# Patient Record
Sex: FEMALE | Race: WHITE | Employment: OTHER | URBAN - METROPOLITAN AREA
[De-identification: names, ages, dates, MRNs, and addresses within clinical notes are randomized per-mention and may not be internally consistent; named-entity substitution may affect disease eponyms.]

---

## 2019-12-19 ENCOUNTER — HOSPITAL ENCOUNTER (INPATIENT)
Facility: CLINIC | Age: 49
LOS: 1 days | Discharge: HOME | DRG: 392 | End: 2019-12-21
Attending: EMERGENCY MEDICINE | Admitting: INTERNAL MEDICINE
Payer: COMMERCIAL

## 2019-12-19 ENCOUNTER — APPOINTMENT (OUTPATIENT)
Dept: CT IMAGING | Facility: CLINIC | Age: 49
DRG: 392 | End: 2019-12-19
Attending: EMERGENCY MEDICINE
Payer: COMMERCIAL

## 2019-12-19 DIAGNOSIS — R91.1 RIGHT MIDDLE LOBE PULMONARY NODULE: ICD-10-CM

## 2019-12-19 DIAGNOSIS — K56.609 SMALL BOWEL OBSTRUCTION (H): ICD-10-CM

## 2019-12-19 DIAGNOSIS — R10.11 RUQ ABDOMINAL PAIN: ICD-10-CM

## 2019-12-19 LAB
ALBUMIN SERPL-MCNC: 4.5 G/DL (ref 3.4–5)
ALP SERPL-CCNC: 137 U/L (ref 40–150)
ALT SERPL W P-5'-P-CCNC: 32 U/L (ref 0–50)
ANION GAP SERPL CALCULATED.3IONS-SCNC: 10 MMOL/L (ref 3–14)
AST SERPL W P-5'-P-CCNC: 20 U/L (ref 0–45)
BASOPHILS # BLD AUTO: 0.1 10E9/L (ref 0–0.2)
BASOPHILS NFR BLD AUTO: 0.5 %
BILIRUB SERPL-MCNC: 0.4 MG/DL (ref 0.2–1.3)
BUN SERPL-MCNC: 13 MG/DL (ref 7–30)
CALCIUM SERPL-MCNC: 9.6 MG/DL (ref 8.5–10.1)
CHLORIDE SERPL-SCNC: 105 MMOL/L (ref 94–109)
CO2 BLDCOV-SCNC: 26 MMOL/L (ref 21–28)
CO2 SERPL-SCNC: 25 MMOL/L (ref 20–32)
CREAT SERPL-MCNC: 0.56 MG/DL (ref 0.52–1.04)
DIFFERENTIAL METHOD BLD: ABNORMAL
EOSINOPHIL # BLD AUTO: 0 10E9/L (ref 0–0.7)
EOSINOPHIL NFR BLD AUTO: 0.1 %
ERYTHROCYTE [DISTWIDTH] IN BLOOD BY AUTOMATED COUNT: 13 % (ref 10–15)
GFR SERPL CREATININE-BSD FRML MDRD: >90 ML/MIN/{1.73_M2}
GLUCOSE SERPL-MCNC: 132 MG/DL (ref 70–99)
HCT VFR BLD AUTO: 45.5 % (ref 35–47)
HGB BLD-MCNC: 15.1 G/DL (ref 11.7–15.7)
IMM GRANULOCYTES # BLD: 0.1 10E9/L (ref 0–0.4)
IMM GRANULOCYTES NFR BLD: 0.5 %
INTERPRETATION ECG - MUSE: NORMAL
LACTATE BLD-SCNC: 2 MMOL/L (ref 0.7–2.1)
LIPASE SERPL-CCNC: 79 U/L (ref 73–393)
LYMPHOCYTES # BLD AUTO: 1.3 10E9/L (ref 0.8–5.3)
LYMPHOCYTES NFR BLD AUTO: 9.5 %
MCH RBC QN AUTO: 30.6 PG (ref 26.5–33)
MCHC RBC AUTO-ENTMCNC: 33.2 G/DL (ref 31.5–36.5)
MCV RBC AUTO: 92 FL (ref 78–100)
MONOCYTES # BLD AUTO: 0.4 10E9/L (ref 0–1.3)
MONOCYTES NFR BLD AUTO: 2.8 %
NEUTROPHILS # BLD AUTO: 11.5 10E9/L (ref 1.6–8.3)
NEUTROPHILS NFR BLD AUTO: 86.6 %
NRBC # BLD AUTO: 0 10*3/UL
NRBC BLD AUTO-RTO: 0 /100
PCO2 BLDV: 44 MM HG (ref 40–50)
PH BLDV: 7.38 PH (ref 7.32–7.43)
PLATELET # BLD AUTO: 298 10E9/L (ref 150–450)
PO2 BLDV: 33 MM HG (ref 25–47)
POTASSIUM SERPL-SCNC: 3.7 MMOL/L (ref 3.4–5.3)
PROT SERPL-MCNC: 8.3 G/DL (ref 6.8–8.8)
RBC # BLD AUTO: 4.93 10E12/L (ref 3.8–5.2)
SAO2 % BLDV FROM PO2: 61 %
SODIUM SERPL-SCNC: 140 MMOL/L (ref 133–144)
WBC # BLD AUTO: 13.3 10E9/L (ref 4–11)

## 2019-12-19 PROCEDURE — 82803 BLOOD GASES ANY COMBINATION: CPT

## 2019-12-19 PROCEDURE — 74177 CT ABD & PELVIS W/CONTRAST: CPT

## 2019-12-19 PROCEDURE — 93005 ELECTROCARDIOGRAM TRACING: CPT

## 2019-12-19 PROCEDURE — 25000128 H RX IP 250 OP 636: Performed by: EMERGENCY MEDICINE

## 2019-12-19 PROCEDURE — 96365 THER/PROPH/DIAG IV INF INIT: CPT | Mod: 59

## 2019-12-19 PROCEDURE — 96375 TX/PRO/DX INJ NEW DRUG ADDON: CPT

## 2019-12-19 PROCEDURE — 25000125 ZZHC RX 250: Performed by: EMERGENCY MEDICINE

## 2019-12-19 PROCEDURE — 83605 ASSAY OF LACTIC ACID: CPT

## 2019-12-19 PROCEDURE — 99285 EMERGENCY DEPT VISIT HI MDM: CPT | Mod: 25

## 2019-12-19 PROCEDURE — 80053 COMPREHEN METABOLIC PANEL: CPT | Performed by: EMERGENCY MEDICINE

## 2019-12-19 PROCEDURE — 85025 COMPLETE CBC W/AUTO DIFF WBC: CPT | Performed by: EMERGENCY MEDICINE

## 2019-12-19 PROCEDURE — 96361 HYDRATE IV INFUSION ADD-ON: CPT

## 2019-12-19 PROCEDURE — 25800030 ZZH RX IP 258 OP 636: Performed by: EMERGENCY MEDICINE

## 2019-12-19 PROCEDURE — 83690 ASSAY OF LIPASE: CPT | Performed by: EMERGENCY MEDICINE

## 2019-12-19 RX ORDER — CETIRIZINE HYDROCHLORIDE 10 MG/1
10 TABLET ORAL DAILY PRN
COMMUNITY

## 2019-12-19 RX ORDER — MOMETASONE FUROATE MONOHYDRATE 50 UG/1
1 SPRAY, METERED NASAL DAILY
COMMUNITY

## 2019-12-19 RX ORDER — MORPHINE SULFATE 4 MG/ML
4 INJECTION, SOLUTION INTRAMUSCULAR; INTRAVENOUS
Status: DISCONTINUED | OUTPATIENT
Start: 2019-12-19 | End: 2019-12-20

## 2019-12-19 RX ORDER — LOPERAMIDE HCL 2 MG
2 CAPSULE ORAL 4 TIMES DAILY PRN
COMMUNITY

## 2019-12-19 RX ORDER — IOPAMIDOL 755 MG/ML
500 INJECTION, SOLUTION INTRAVASCULAR ONCE
Status: COMPLETED | OUTPATIENT
Start: 2019-12-19 | End: 2019-12-19

## 2019-12-19 RX ORDER — ONDANSETRON 2 MG/ML
4 INJECTION INTRAMUSCULAR; INTRAVENOUS EVERY 30 MIN PRN
Status: DISCONTINUED | OUTPATIENT
Start: 2019-12-19 | End: 2019-12-20

## 2019-12-19 RX ORDER — SODIUM CHLORIDE, SODIUM LACTATE, POTASSIUM CHLORIDE, CALCIUM CHLORIDE 600; 310; 30; 20 MG/100ML; MG/100ML; MG/100ML; MG/100ML
1000 INJECTION, SOLUTION INTRAVENOUS CONTINUOUS
Status: DISCONTINUED | OUTPATIENT
Start: 2019-12-19 | End: 2019-12-20

## 2019-12-19 RX ADMIN — FAMOTIDINE 20 MG: 10 INJECTION, SOLUTION INTRAVENOUS at 20:32

## 2019-12-19 RX ADMIN — TAZOBACTAM SODIUM AND PIPERACILLIN SODIUM 3.38 G: 375; 3 INJECTION, SOLUTION INTRAVENOUS at 23:00

## 2019-12-19 RX ADMIN — SODIUM CHLORIDE 59 ML: 9 INJECTION, SOLUTION INTRAVENOUS at 21:48

## 2019-12-19 RX ADMIN — SODIUM CHLORIDE, POTASSIUM CHLORIDE, SODIUM LACTATE AND CALCIUM CHLORIDE 1000 ML: 600; 310; 30; 20 INJECTION, SOLUTION INTRAVENOUS at 20:40

## 2019-12-19 RX ADMIN — IOPAMIDOL 74 ML: 755 INJECTION, SOLUTION INTRAVENOUS at 21:47

## 2019-12-19 RX ADMIN — MORPHINE SULFATE 4 MG: 4 INJECTION INTRAVENOUS at 20:32

## 2019-12-19 RX ADMIN — ONDANSETRON HYDROCHLORIDE 4 MG: 2 INJECTION, SOLUTION INTRAMUSCULAR; INTRAVENOUS at 20:32

## 2019-12-19 ASSESSMENT — ENCOUNTER SYMPTOMS
DIARRHEA: 1
VOMITING: 1
NAUSEA: 1
ABDOMINAL PAIN: 1

## 2019-12-20 PROBLEM — K56.609 SBO (SMALL BOWEL OBSTRUCTION) (H): Status: ACTIVE | Noted: 2019-12-20

## 2019-12-20 LAB
ALBUMIN UR-MCNC: 30 MG/DL
APPEARANCE UR: ABNORMAL
BILIRUB UR QL STRIP: NEGATIVE
COLOR UR AUTO: YELLOW
GLUCOSE UR STRIP-MCNC: NEGATIVE MG/DL
HGB UR QL STRIP: NEGATIVE
KETONES UR STRIP-MCNC: ABNORMAL MG/DL
LEUKOCYTE ESTERASE UR QL STRIP: ABNORMAL
MUCOUS THREADS #/AREA URNS LPF: PRESENT /LPF
NITRATE UR QL: NEGATIVE
PH UR STRIP: 5.5 PH (ref 5–7)
RBC #/AREA URNS AUTO: 3 /HPF (ref 0–2)
SOURCE: ABNORMAL
SP GR UR STRIP: 1.02 (ref 1–1.03)
SQUAMOUS #/AREA URNS AUTO: 25 /HPF (ref 0–1)
UROBILINOGEN UR STRIP-MCNC: NORMAL MG/DL (ref 0–2)
WBC #/AREA URNS AUTO: 18 /HPF (ref 0–5)

## 2019-12-20 PROCEDURE — 25800030 ZZH RX IP 258 OP 636: Performed by: INTERNAL MEDICINE

## 2019-12-20 PROCEDURE — 99222 1ST HOSP IP/OBS MODERATE 55: CPT | Mod: AI | Performed by: INTERNAL MEDICINE

## 2019-12-20 PROCEDURE — 81001 URINALYSIS AUTO W/SCOPE: CPT | Performed by: INTERNAL MEDICINE

## 2019-12-20 PROCEDURE — 25000128 H RX IP 250 OP 636: Performed by: EMERGENCY MEDICINE

## 2019-12-20 PROCEDURE — 96376 TX/PRO/DX INJ SAME DRUG ADON: CPT

## 2019-12-20 PROCEDURE — 87086 URINE CULTURE/COLONY COUNT: CPT | Performed by: INTERNAL MEDICINE

## 2019-12-20 PROCEDURE — 12000000 ZZH R&B MED SURG/OB

## 2019-12-20 PROCEDURE — 25000128 H RX IP 250 OP 636: Performed by: INTERNAL MEDICINE

## 2019-12-20 RX ORDER — LIDOCAINE 40 MG/G
CREAM TOPICAL
Status: DISCONTINUED | OUTPATIENT
Start: 2019-12-20 | End: 2019-12-21 | Stop reason: HOSPADM

## 2019-12-20 RX ORDER — SODIUM CHLORIDE 9 MG/ML
INJECTION, SOLUTION INTRAVENOUS CONTINUOUS
Status: DISCONTINUED | OUTPATIENT
Start: 2019-12-20 | End: 2019-12-21

## 2019-12-20 RX ORDER — ONDANSETRON 4 MG/1
4 TABLET, ORALLY DISINTEGRATING ORAL EVERY 6 HOURS PRN
Status: DISCONTINUED | OUTPATIENT
Start: 2019-12-20 | End: 2019-12-21 | Stop reason: HOSPADM

## 2019-12-20 RX ORDER — FLUTICASONE PROPIONATE 50 MCG
1 SPRAY, SUSPENSION (ML) NASAL DAILY
Status: DISCONTINUED | OUTPATIENT
Start: 2019-12-20 | End: 2019-12-21 | Stop reason: HOSPADM

## 2019-12-20 RX ORDER — HYDROMORPHONE HYDROCHLORIDE 1 MG/ML
0.2 INJECTION, SOLUTION INTRAMUSCULAR; INTRAVENOUS; SUBCUTANEOUS
Status: DISCONTINUED | OUTPATIENT
Start: 2019-12-20 | End: 2019-12-21 | Stop reason: HOSPADM

## 2019-12-20 RX ORDER — ONDANSETRON 2 MG/ML
4 INJECTION INTRAMUSCULAR; INTRAVENOUS EVERY 6 HOURS PRN
Status: DISCONTINUED | OUTPATIENT
Start: 2019-12-20 | End: 2019-12-21 | Stop reason: HOSPADM

## 2019-12-20 RX ORDER — ONDANSETRON 4 MG/1
4 TABLET, ORALLY DISINTEGRATING ORAL EVERY 6 HOURS PRN
Status: DISCONTINUED | OUTPATIENT
Start: 2019-12-20 | End: 2019-12-20

## 2019-12-20 RX ORDER — NALOXONE HYDROCHLORIDE 0.4 MG/ML
.1-.4 INJECTION, SOLUTION INTRAMUSCULAR; INTRAVENOUS; SUBCUTANEOUS
Status: DISCONTINUED | OUTPATIENT
Start: 2019-12-20 | End: 2019-12-21 | Stop reason: HOSPADM

## 2019-12-20 RX ORDER — ONDANSETRON 2 MG/ML
4 INJECTION INTRAMUSCULAR; INTRAVENOUS EVERY 6 HOURS PRN
Status: DISCONTINUED | OUTPATIENT
Start: 2019-12-20 | End: 2019-12-20

## 2019-12-20 RX ADMIN — TAZOBACTAM SODIUM AND PIPERACILLIN SODIUM 3.38 G: 375; 3 INJECTION, SOLUTION INTRAVENOUS at 15:51

## 2019-12-20 RX ADMIN — HYDROMORPHONE HYDROCHLORIDE 0.2 MG: 1 INJECTION, SOLUTION INTRAMUSCULAR; INTRAVENOUS; SUBCUTANEOUS at 12:49

## 2019-12-20 RX ADMIN — SODIUM CHLORIDE: 9 INJECTION, SOLUTION INTRAVENOUS at 22:42

## 2019-12-20 RX ADMIN — TAZOBACTAM SODIUM AND PIPERACILLIN SODIUM 3.38 G: 375; 3 INJECTION, SOLUTION INTRAVENOUS at 10:28

## 2019-12-20 RX ADMIN — SODIUM CHLORIDE: 9 INJECTION, SOLUTION INTRAVENOUS at 01:46

## 2019-12-20 RX ADMIN — ONDANSETRON HYDROCHLORIDE 4 MG: 2 INJECTION, SOLUTION INTRAMUSCULAR; INTRAVENOUS at 01:43

## 2019-12-20 RX ADMIN — TAZOBACTAM SODIUM AND PIPERACILLIN SODIUM 3.38 G: 375; 3 INJECTION, SOLUTION INTRAVENOUS at 03:59

## 2019-12-20 RX ADMIN — HYDROMORPHONE HYDROCHLORIDE 0.2 MG: 1 INJECTION, SOLUTION INTRAMUSCULAR; INTRAVENOUS; SUBCUTANEOUS at 09:43

## 2019-12-20 RX ADMIN — SODIUM CHLORIDE: 9 INJECTION, SOLUTION INTRAVENOUS at 12:39

## 2019-12-20 RX ADMIN — MORPHINE SULFATE 4 MG: 4 INJECTION INTRAVENOUS at 00:13

## 2019-12-20 RX ADMIN — HYDROMORPHONE HYDROCHLORIDE 0.2 MG: 1 INJECTION, SOLUTION INTRAMUSCULAR; INTRAVENOUS; SUBCUTANEOUS at 04:15

## 2019-12-20 RX ADMIN — TAZOBACTAM SODIUM AND PIPERACILLIN SODIUM 3.38 G: 375; 3 INJECTION, SOLUTION INTRAVENOUS at 22:41

## 2019-12-20 RX ADMIN — ONDANSETRON HYDROCHLORIDE 4 MG: 2 INJECTION, SOLUTION INTRAMUSCULAR; INTRAVENOUS at 00:09

## 2019-12-20 ASSESSMENT — ACTIVITIES OF DAILY LIVING (ADL)
DRESS: 0-->INDEPENDENT
TRANSFERRING: 0-->INDEPENDENT
AMBULATION: 0-->INDEPENDENT
TOILETING: 0-->INDEPENDENT
ADLS_ACUITY_SCORE: 14
BATHING: 0-->INDEPENDENT
SWALLOWING: 0-->SWALLOWS FOODS/LIQUIDS WITHOUT DIFFICULTY
FALL_HISTORY_WITHIN_LAST_SIX_MONTHS: NO
ADLS_ACUITY_SCORE: 14
ADLS_ACUITY_SCORE: 12
COGNITION: 0 - NO COGNITION ISSUES REPORTED
RETIRED_EATING: 0-->INDEPENDENT
ADLS_ACUITY_SCORE: 12
ADLS_ACUITY_SCORE: 14
RETIRED_COMMUNICATION: 0-->UNDERSTANDS/COMMUNICATES WITHOUT DIFFICULTY

## 2019-12-20 ASSESSMENT — MIFFLIN-ST. JEOR: SCORE: 1337.57

## 2019-12-20 NOTE — H&P
[facility] [place]    History and Physical - Hospitalist Service       Date of Admission:  12/19/2019    Assessment & Plan   Clive Mcnair is a 49 year old female admitted on 12/19/2019. She has a past medical history significant for IBS and previous rectal cancer.  She presented to emergency room for abdominal pain.  Found to have small bowel obstruction and possible terminal ileitis.    1.  Small bowel obstruction.  N.p.o.  Continuous IV fluids.  Pain medications as needed.  Antiemetics as needed.  General surgery consult.      2.  Possible terminal ileitis.  Continue to cover possible infection with IV Zosyn.  General surgery and gastroenterology consults.  Continuous IV fluids.  N.p.o.  Pain medications as needed.     Diet: NPO for Medical/Clinical Reasons Except for: No Exceptions    DVT Prophylaxis: Pneumatic Compression Devices  [place] Catheter: not present  Code Status: Full Code      Disposition Plan   Expected discharge: 2 to 3 days.  Recommended to prior living arrangement     Reginaldo Copeland, DO  [facility] [place]    ______________________________________________________________________    Chief Complaint   Abdominal pain.    History is obtained from the patient    History of Present Illness   Clive Mcnair is a 49 year old female who has a past medical history significant for IBS and rectal cancer.  She developed fairly acute onset of abdominal pain early in the day on 12/19.  She does have fairly frequent episodes of abdominal pain due to her IBS.  Abdominal pain that started on 12/19 felt significantly different than usual abdominal pain from her IBS.  Pain is located in the upper quadrants of her abdomen bilaterally.  Usually she has pain in her lower abdomen.  She did try taking some ibuprofen for pain at home.  Ibuprofen did not help any significant extent with pain.  She also developed nausea and has vomited 5-6 times since symptoms began.  She is also felt like she has  had intermittent fevers and chills.Symptoms are somewhat similar to symptoms that she had when she required removal of her gallbladder a year and a half ago.  Symptoms are better at this time after pain and nausea medications given since arrival at hospital.  No other acute complaints.    Review of Systems    The 10 point Review of Systems is negative other than noted in the HPI     Past Medical History    IBS.  Rectal cancer.    Past Surgical History   I have reviewed this patient's surgical history and updated it with pertinent information if needed.  Cholecystectomy approximately 18 months ago.    Social History   I have reviewed this patient's social history and updated it with pertinent information if needed.  Social History     Tobacco Use     Smoking status: Not on file   Substance Use Topics     Alcohol use: Not on file     Drug use: Not on file       Family History   I have reviewed this patient's family history and updated it with pertinent information if needed.   No known CAD or cancer in immediate family.    Prior to Admission Medications   Prior to Admission Medications   Prescriptions Last Dose Informant Patient Reported? Taking?   DM-APAP-CPM (VICKS NYQUIL COLD & FLU NIGHT) -4 MG/30ML LIQD 12/18/2019 at Unknown time  Yes Yes   Sig: Take 15 mLs by mouth At Bedtime   cetirizine (ZYRTEC) 10 MG tablet Past Week at Unknown time  Yes Yes   Sig: Take 10 mg by mouth daily as needed for allergies   loperamide (IMODIUM) 2 MG capsule 12/19/2019 at Unknown time  Yes Yes   Sig: Take 2 mg by mouth 4 times daily as needed for diarrhea   mometasone (NASONEX) 50 MCG/ACT nasal spray 12/18/2019 at Unknown time  Yes Yes   Sig: Spray 1 spray into both nostrils daily      Facility-Administered Medications: None     Allergies   Allergies   Allergen Reactions     Sulfa Drugs Anaphylaxis     Gadoxetate Nausea and Vomiting and Hives       Physical Exam   Vital Signs: Temp: 97.2  F (36.2  C) Temp src: Oral BP: (!) 148/85  Pulse: 104   Resp: 16 SpO2: 95 % O2 Device: None (Room air)    Weight: 165 lbs 0 oz    Gen:  NAD, A&Ox3.  Eyes:  PERRL, sclera anicteric.  OP:  MMM, no lesions.  Neck:  Supple.  CV:  Regular, no murmurs.  Lung:  CTA b/l, normal effort.  Ab:  +BS, soft.  Skin:  Warm, dry to touch.  No rash.  Ext:  No pitting edema LE b/l.      Data   Data reviewed today: I reviewed all medications, new labs and imaging results over the last 24 hours. I personally reviewed the EKG tracing showing Sinus rhythm, no obvious acute ischemia.    Recent Labs   Lab 12/19/19  2041   WBC 13.3*   HGB 15.1   MCV 92         POTASSIUM 3.7   CHLORIDE 105   CO2 25   BUN 13   CR 0.56   ANIONGAP 10   SREEDHAR 9.6   *   ALBUMIN 4.5   PROTTOTAL 8.3   BILITOTAL 0.4   ALKPHOS 137   ALT 32   AST 20   LIPASE 79

## 2019-12-20 NOTE — PROGRESS NOTES
Brief Update Note    Patient was seen and examined this morning.  History and physical reviewed.  On my interview, she was having some mild right-sided abdominal pain which had improved after recent pain medication administration.    Patient has inflammation of the terminal ileum. The etiology is not known at this point. I reviewed the notes from GI and colorectal surgery. Plan at this time is to continue managing this conservatively without steroids. She will remain NPO for today. Continue Zosyn for now.     Ruiz Reynaga MD

## 2019-12-20 NOTE — ED PROVIDER NOTES
History     Chief Complaint:  Abdominal Pain      CAL Mcnair is a 49 year old female with a history of IBS and rectal cancer in remission who presents to the emergency department for evaluation of abdominal pain. She began to experience abdominal pain at 0910 this morning. Her abdominal pain this morning was accompanied by nausea, vomiting, and diarrhea. She took an immodium at the onset because it felt like her IBS acting up. She states that her pain feels like gall bladder rather than IBS. She has no history of bowel obstruction. She took Tylenol this morning for the pain. No fevers.     Allergies:  Sulfa drugs  Gadoxetate    Medications:    Zyrtec  Flonase  Imodium  Ativan  Zofran     Past Medical History:    Non rheumatic aortic valve insufficiency  Genital warts  Depression  Anxiety  Seasonal affective disorder  Rectal cancer  Biliary colic  Allergic rhinitis  Hepatitis  IBS  UTI  Obesity  STD  Obsessive-compulsive personality disorder    Past Surgical History:    Tonsillectomy   section  [place] teeth extraction  Dilation and curettage  Colon surgery    Family History:    High blood pressure  High cholesterol  Hypertension  Heart disease  Thyroid disease    Social History:  The patient presents today with her .  Never smoker  Positive for alcohol use.   Marital Status:      Review of Systems   Gastrointestinal: Positive for abdominal pain, diarrhea, nausea and vomiting.   All other systems reviewed and are negative.    Physical Exam     Patient Vitals for the past 24 hrs:   BP Temp Temp src Pulse Resp SpO2 Weight   19 2345 -- -- -- -- -- 95 % --   19 2315 -- -- -- -- -- 98 % --   19 2300 (!) 148/98 -- -- 106 -- 97 % --   19 2245 -- -- -- -- -- 99 % --   19 2230 (!) 136/98 -- -- -- -- -- --   199 -- -- -- -- -- -- 68 kg (150 lb)   19 (!) 140/128 97.2  F (36.2  C) Temporal 131 16 99 % --     Physical Exam  General:  Well-nourished, appears to be uncomfortable  Eyes: PERRL, conjunctivae pink no scleral icterus or conjunctival injection  ENT:  Moist mucus membranes, posterior oropharynx clear without erythema or exudates  Respiratory:  Lungs clear to auscultation bilaterally, no crackles/rubs/wheezes.  Good air movement  CV: Tachycardic rate and regular rhythm, no murmurs/rubs/gallops  GI:  Abdomen soft and non-distended.  Normoactive BS. Moderate RUQ and epigastric tenderness, no guarding or rebound  Skin: Warm, dry.  No rashes or petechiae  Musculoskeletal: No peripheral edema or calf tenderness  Neuro: Alert and oriented to person/place/time  Psychiatric: Normal affect      Emergency Department Course     ECG:  Time: 2011  Vent. Rate 113 bpm. RI interval 144. QRS duration 72. QT/QTc 320/438. P-R-T axis 39 32 27.  Sinus tachycardia Otherwise normal ECG  Read time: 2025    Imaging:  Radiology findings were communicated with the patient who voiced understanding of the findings.    CT Abdomen Pelvis w Contrast:    CT Abdomen Pelvis w Contrast   Final Result   IMPRESSION:    1.  Dilatation of multiple small bowel loops throughout the right mid abdomen and right pelvis extending up to the distal terminal ileum (approximately the last 10 to 15 cm) where there is severe small bowel wall thickening, mucosal hyperenhancement and    marked surrounding inflammatory changes in the adjacent small bowel mesentery. Findings compatible with developing small bowel obstruction related to a localized inflammatory process of the terminal ileum, possibly Crohn's disease versus acute infection.    Given the long segment this would unlikely be related to malignancy. No evidence for definitive perforation or abscess formation although there is significant inflammatory fluid in the pelvis. No definitive evidence for fistulization.      2.  No other areas of inflammatory change involving the small bowel. No evidence for inflammatory change involving the  colon other than reactive changes at the cecum and ascending colon.      3.  Diffuse fatty infiltration of the liver.      4.  Minimally complex left upper pole renal cyst. Long-term follow-up ultrasound recommended.      5.  5 mm mean diameter pulmonary nodule right middle lobe. Recommend follow-up at 12 months per Fleischner Society criteria below.      Fleischner Society Recommendations for Pulmonary Nodules      Nodule size less than 6 mm:       Nodules < 6 mm do not require routine follow-up, but certain patients at high risk with suspicious nodule morphology, upper lobe location, or both may warrant 12-month follow-up.                   Laboratory:  Laboratory findings were communicated with the patient who voiced understanding of the findings.    CBC: WBC 13.3 (H) o/w WNL. (HGB 15.1, )   CMP: Glucose 132 (H) o/w WNL (Creatinine 0.56)    Lipase 79     istat lactic 2.0    Interventions:  2032 Zofran 4 mg IV    2032 Pepcid 20 mg IV    2032 Morphine 4 mg IV    2040 LR 1L IV    2233 zosyn 3.375g IV        Emergency Department Course:    2005 Nursing notes and vitals reviewed.    2009 I performed an exam of the patient as documented above.     2011 EKG obtained as noted above.    2041 IV was inserted and blood was drawn for laboratory testing, results above.    2147 The patient was sent for a CT Abdomen Pelvis w Contrast while in the emergency department, results above.     Findings and plan explained to the Patient who consents to admission. Discussed the patient with Dr. Omalley, who will admit the patient to a medical bed for further monitoring, evaluation, and treatment. I also discussed incidental findings and recommendations for follow-up imaging with her and her  and gave them copies of the results.       Impression & Plan     Medical Decision Making:  Clive Mcnair is a 49 year old female who presents to the emergency department today with abdominal pain, nausea and vomiting.  Her  laboratory studies showed a mild leukocytosis.  She is afebrile.  Lactic was normal.  She was initially quite tachycardic but this improved and resolved with IV fluid hydration as well as pain control.  She had no further vomiting after antiemetics.  CT was obtained and showed likely developing small bowel obstruction with small bowel wall thickening.  Given the possibility of infection, she was treated with Zosyn.  I discussed incidental findings on the CT with her.  At this time we will admit her to the hospital for ongoing pain control, IV fluids, antiemetics and reassessment.  Her CT raises the possibility that she actually has inflammatory bowel disease rather than irritable bowel syndrome and I do feel she will need to be seen by gastroenterology.  I spoke with Dr. Reginaldo Omalley who graciously agreed admit the patient.  The patient and her  were in agreement with the plan.      Discharge Diagnosis:    ICD-10-CM    1. RUQ abdominal pain R10.11 Lactic acid whole blood     ISTAT gases lactate maryanne POCT     ISTAT gases lactate maryanne POCT     CANCELED: Lactic acid whole blood   2. Small bowel obstruction (H) K56.609    3. Right middle lobe pulmonary nodule R91.1        Disposition:  The patient is admitted into the care of Dr. Omalley.    Scribe Disclosure:  I, Jc Clay, am serving as a scribe at 8:14 PM on 12/19/2019 to document services personally performed by Niya Dick MD based on my observations and the provider's statements to me.      Niya Dick MD  12/20/19 0014       Niya Dick MD  12/20/19 0015

## 2019-12-20 NOTE — CONSULTS
GASTROENTEROLOGY CONSULTATION      Clive Mcnair  [id number] [facility] [id number]  49 year old female     Admission Date/Time: 12/19/2019  Primary Care Provider: Marcela Gonzales  Referring / Attending Physician:  Dr. Copeland     We were asked to see the patient in consultation by Dr. Copeland for evaluation of small bowel obstruction.        HPI:  Clive Mcnair is a 49 year old female with medical history of rectal cancer, status post low anterior resection with subsequent radiation and chemotherapy, cholecystectomy 3/2018,  and IBS who presented to the ED with abdominal pain.     Patient reports sudden onset of abdominal pain yesterday morning.  This was a severe pain near her bellybutton.  She became acutely nauseated and felt that she needed to throw up.  She threw up on 5-6 occasions.  Initially she thought her abdominal pain was related to her irritable bowel syndrome.  However the pain progressed so she decided to come to the emergency room.  Patient denies any fever or chills.  Her last bowel movement was yesterday before the pain began.  At baseline she has anywhere from 8-10 bowel movements per day which she attributes to her previous colon resection.  She has use Imodium regularly for management.  No melena or hematochezia.  No further vomiting.  She does not typically use ibuprofen but took 2 yesterday to help with her pain.  She has no history of small bowel obstruction.  There is no family history of inflammatory bowel disease.    The patient was diagnosed with rectal cancer in 2006, T3N0.  She underwent low anterior resection at [facility] with subsequent radiation and chemotherapy [id number].   Last colonoscopy 11/26/2019 through / [facility]. This was an unremarkable exam with evidence of patent end-to end colo-colonic anastomosis in the sigmoid. The ICV was identified without intubation of the TI.  2016 colonoscopy with 3 tubular adenomas.  Patient receives a colonoscopy  "every 3 years for screening.  She does follow with oncology annually and there has been no evidence of disease recurrence     The emergency room a CT scan revealed a small bowel obstruction in the distal terminal ileum.  There appeared to be small bowel wall thickening and inflammatory changes.  There were no other areas of inflammatory change in the small bowel or colon.       PAST MEDICAL HISTORY:  1. 2006 Rectal cancer s/p low anterior resection, chemo and radiation.   2. IBS       ROS: A comprehensive ten point review of systems was negative aside from those in mentioned in the HPI.       MEDICATIONS:   Prior to Admission medications    Medication Sig Start Date End Date Taking? Authorizing Provider   cetirizine (ZYRTEC) 10 MG tablet Take 10 mg by mouth daily as needed for allergies   Yes Unknown, Entered By History   DM-APAP-CPM (VICKS NYQUIL COLD & FLU NIGHT) -4 MG/30ML LIQD Take 15 mLs by mouth At Bedtime   Yes Unknown, Entered By History   loperamide (IMODIUM) 2 MG capsule Take 2 mg by mouth 4 times daily as needed for diarrhea   Yes Unknown, Entered By History   mometasone (NASONEX) 50 MCG/ACT nasal spray Spray 1 spray into both nostrils daily   Yes Unknown, Entered By History        ALLERGIES:   Allergies   Allergen Reactions     Sulfa Drugs Anaphylaxis     Gadoxetate Nausea and Vomiting and Hives        SOCIAL HISTORY:  Social History     Tobacco Use     Smoking status: Not on file   Substance Use Topics     Alcohol use: Not on file     Drug use: Not on file        FAMILY HISTORY: No family history of colon polyps or colon cancer       PHYSICAL EXAM:     BP (!) 148/85 (BP Location: Right arm)   Pulse 104   Temp 97.2  F (36.2  C) (Temporal)   Resp 16   Ht 1.6 m (5' 3\")   Wt 74.8 kg (165 lb)   SpO2 95%   BMI 29.23 kg/m       PHYSICAL EXAM:  GENERAL:  NAD  SKIN: no suspicious lesions, rashes, jaundice  HEAD: Normocephalic. Atraumatic.  NECK: Neck supple. No adenopathy.   EYES: No scleral " icterus  RESPIRATORY: Good transmission. CTA bilaterally.   CARDIOVASCULAR: RRR, normal S1, S2,  No murmur appreciated  GASTROINTESTINAL: Hypoactive BS, soft, tender in RLQ, no guarding/rebound  JOINT/EXTREMITIES:  no gross deformities noted, normal muscle tone  NEURO: CN 2-12 grossly intact, no focal deficits  PSYCH: Normal affect        ADDITIONAL COMMENTS:   I reviewed the patient's new clinical lab test results.   Recent Labs   Lab Test 12/19/19 2041   WBC 13.3*   HGB 15.1   MCV 92        Recent Labs   Lab Test 12/19/19 2041   POTASSIUM 3.7   CHLORIDE 105   CO2 25   BUN 13   ANIONGAP 10     Recent Labs   Lab Test 12/19/19 2041   ALBUMIN 4.5   BILITOTAL 0.4   ALT 32   AST 20   LIPASE 79        IMAGING / ENDOSCOPY    CT ABDOMEN PELVIS W CONTRAST  DATE/TIME: 12/19/2019 9:47 PM  IMPRESSION:   1.  Dilatation of multiple small bowel loops throughout the right mid abdomen and right pelvis extending up to the distal terminal ileum (approximately the last 10 to 15 cm) where there is severe small bowel wall thickening, mucosal hyperenhancement and   marked surrounding inflammatory changes in the adjacent small bowel mesentery. Findings compatible with developing small bowel obstruction related to a localized inflammatory process of the terminal ileum, possibly Crohn's disease versus acute infection.   Given the long segment this would unlikely be related to malignancy. No evidence for definitive perforation or abscess formation although there is significant inflammatory fluid in the pelvis. No definitive evidence for fistulization.     2.  No other areas of inflammatory change involving the small bowel. No evidence for inflammatory change involving the colon other than reactive changes at the cecum and ascending colon.  3.  Diffuse fatty infiltration of the liver.  4.  Minimally complex left upper pole renal cyst. Long-term follow-up ultrasound recommended.  5.  5 mm mean diameter pulmonary nodule right middle  lobe.      CONSULTATION ASSESSMENT AND PLAN:    Clive Mcnair is a 49 year old female with medical history of rectal cancer, status post low anterior resection with subsequent radiation and chemotherapy, cholecystectomy 3/2018, anxiety, depression, and IBS who presented to the ED with abdominal pain found to have a small bowel obstruction.    1.  Small bowel obstruction: Presenting with sudden abdominal pain, nausea, and vomiting.  CT scan with evidence of obstruction at the distal terminal ileum with surrounding wall thickening and inflammatory change.  Concern for malignancy, small bowel Crohn's disease, scar tissue/adhesions from previous abdominal surgeries as well as previous radiation.  Patient is no longer nauseated or vomiting but continues to have hypoactive bowel sounds without passage of flatus or stool.  Colonoscopy just last month without any colonic findings although the terminal ileum was not intubated.      -- At this point would continue conservative management with IV fluids, pain control, and bowel rest.  If no improvement, surgery may be necessary.  Pathology can be obtained at that time.  -- No role for steroids at this time.  -- No plans for any endoscopic procedure given acute obstruction.  If clinical improvement, would arrange outpatient colonoscopy with intubation and biopsy of the terminal ileum per [facility].  -- General surgery has been consulted.     2.  History of rectal cancer: Diagnosed in 2006.  Treated through [facility].  Status post low anterior resection in 2006.  Subsequent radiation and chemotherapy [id number].  She undergoes surveillance colonoscopy every 3 years last just 1 month ago which was normal.  She struggles significantly with diarrhea taking Imodium daily.       I discussed the patient plan with Dr. Cano. Thank you for asking us to participate in the care of this patient.    Libby Weinstein PA-C  [facility] ( [facility])

## 2019-12-20 NOTE — CONSULTS
[facility]  Colon and Rectal Surgery Consult Note  Name: Clive Mcnair    MRN: [id number]  YOB: 1970    Age: 49 year old  Date of admission: 12/19/2019  Primary care provider: Marcela Gonzales     Requesting Physician:  Dr. Reynaga  Reason for consult:  Ileitis, small bowel obstruction, history of rectal cancer           History of Present Illness:   Clive Mcnair is a 49 year old female, seen at the request of Dr. Reynaga, with past medical history of rectal cancer s/p LAR with subsequent chemoradation, IBS, cholecystectomy, chronic diarrhea with incontinence, who presents acute onset of abdominal pain, nausea and vomiting.  This started yesterday.  She initially thought this was IBS and took an Imodium.  She had ongoing nausea and ended up vomiting at least 5 times, the last time she vomited was early this morning.  The pain was initially at her belly button but now is more in the RLQ radiating to the LLQ.  It comes in waves.  The pain is better than it was yesterday.  No fever or chills.  No dysuria, hematuria, rectal bleeding.  No chest pain or shortness of breath.  Her last bowel movement was yesterday morning.  She generally has 10-12 loose stools per day, sometimes up to 24 per day.  She occasionally takes fiber or Imodium to slow the stools.  She has incontinence if she can't get to the bathroom in time.  No history of small bowel obstructions.  No family history of IBD.      She has a history of rectal cancer diagnosed in 2006.  She underwent a low anterior resection and pathology demonstrated a T3N0 cancer.  She then had radiation and chemotherapy.  This was all through [facility].  Her last colonoscopy was just a few weeks ago on November 26, 2019 and was normal.     In the ER yesterday, her WBC was elevated at 13.3.  Metabolic panel was unremarkable.  CT of the abdomen and pelvis showed multiple dilated loops of small bowel, about the last 10-15cm of terminal  "ileum with severe bowel wall thickening, mucosal hypernenhancement and marked surrounding inflammatory changes compatible with a developing small bowel obstruction related to a localized inflammatory process such as Crohn's disease or acute infection.  There is significant inflammatory fluid in the pelvis.  There is also diffuse fatty infiltration of the liver, a left upper pole renal cyst, and a 5mm diameter pulmonary nodule in the right middle lobe.        Colonoscopy History:  November 2019 - normal.  History of 3 tubular adenomas removed in 2016    Surgical History: Low anterior resection for rectal cancer in 2006 at [facility]; 2018 cholecystectomy             Past Medical History:   No past medical history on file.          Past Surgical History:   No past surgical history on file.            Social History:     Social History     Tobacco Use     Smoking status: Not on file   Substance Use Topics     Alcohol use: Not on file             Family History:   No family history on file.          Allergies:     Allergies   Allergen Reactions     Sulfa Drugs Anaphylaxis     Gadoxetate Nausea and Vomiting and Hives             Medications:       fluticasone  1 spray Both Nostrils Daily     piperacillin-tazobactam  3.375 g Intravenous Q6H     sodium chloride (PF)  3 mL Intracatheter Q8H             Review of Systems:   A comprehensive greater than 10 system review of systems was carried out.  Pertinent positives and negatives are noted above.  Otherwise negative for contributory info.            Physical Exam:     Blood pressure (!) 146/90, pulse 97, temperature 98.1  F (36.7  C), temperature source Oral, resp. rate 20, height 1.6 m (5' 3\"), weight 74.8 kg (165 lb), SpO2 94 %.    Intake/Output Summary (Last 24 hours) at 12/20/2019 1149  Last data filed at 12/20/2019 0635  Gross per 24 hour   Intake 474 ml   Output --   Net 474 ml     EXAM:  GEN: Awake alert and oriented, appears stated age, resting in bed, appears " comfortable   PULM: Non-labored breathing with normal respiratory effort  ABD: Soft, tender to palpation in RLQ and periumbilical region; non-distended, no guarding   NEURO: CN II-XII grossly intact  MSK: extremeties with no clubbing, cyanosis or edema  PSYCH: responsive, alert, cooperative; oriented x3; appropriate mood and affect  EXT/SKIN: inspection reveals no rashes, lesions or ulcers, normal coloring         Data Reviewed:     Results for orders placed or performed during the hospital encounter of 12/19/19   CT Abdomen Pelvis w Contrast    Narrative    EXAM: CT ABDOMEN PELVIS W CONTRAST  LOCATION: [facility]  DATE/TIME: 12/19/2019 9:47 PM    INDICATION: Epigastric abdominal pain with tachycardia.  COMPARISON: None.  TECHNIQUE: CT scan of the abdomen and pelvis was performed following injection of IV contrast. Multiplanar reformats were obtained. Dose reduction techniques were used.  CONTRAST: 74 mL Isovue-370.    FINDINGS:   LOWER CHEST: 5 mm mean diameter pulmonary nodule involving the anterior aspect of the right middle lobe, partially visualized (series 3, image 1). No focal infiltrate, consolidation or pleural fluid.    HEPATOBILIARY: Diffuse fatty infiltration of the liver.    Low-attenuation subcentimeter liver lesion(s) compatible with benign cysts or other benign lesions. No specific evaluation or follow-up is recommended in a low risk patient.  Cholecystectomy. Mild extrahepatic biliary prominence likely related to   postcholecystectomy reservoir state.    PANCREAS: No significant mass, duct dilatation, or inflammatory change.    SPLEEN: Normal size spleen. No focal splenic lesions. Splenic vein patent.    ADRENAL GLANDS: No significant nodules.    KIDNEYS/BLADDER: Minimally complex cystic lesion involving the posterior aspect of the left upper renal pole containing peripheral calcification or milk of calcium and measuring 1.2 cm. Adjacent cortical scarring. Low-attenuation subcentimeter  renal   lesion(s). These are compatible with small benign cysts and no specific imaging evaluation or follow-up is recommended. Cortical scarring anterior aspect right kidney. No urinary collecting system dilatation. No overt bladder abnormality.    BOWEL: Dilatation of multiple loops of small bowel throughout the right mid abdomen extending into the right pelvis up to level of the distal 10-15 cm of terminal ileum where there is severe wall thickening, mucosal hyperenhancement and marked   surrounding inflammatory change in the adjacent small bowel mesentery (series 3, image 57). Findings compatible with small bowel obstruction related to a localized inflammatory process of the terminal ileum, possibly Crohn's disease versus an acute   infectious etiology. Underlying chronic stricture cannot be excluded in this area. No definitive evidence for fistulization, perforation or abscess formation. Associated with this inflammatory process is minimal mesenteric edema. No wall thickening   involving the dilated portions of the bowel or elsewhere. No other areas of acute inflammatory change involving the small bowel. Likely mild reactive inflammatory change involving the collapsed cecum and ascending colon. Postoperative changes at the   rectosigmoid junction. Distal esophagus, stomach and duodenum unremarkable.    LYMPH NODES: No lymphadenopathy.    VASCULATURE: Normal caliber abdominal aorta. Minimal atherosclerotic vascular calcification.    PELVIC ORGANS: No overt uterine or adnexal abnormality.    OTHER: Minimal free fluid in the pelvis with minimal to moderate mesenteric edema right lower quadrant amongst the dilated and inflamed small bowel loops.    MUSCULOSKELETAL: No overt osseous abnormality.      Impression    IMPRESSION:   1.  Dilatation of multiple small bowel loops throughout the right mid abdomen and right pelvis extending up to the distal terminal ileum (approximately the last 10 to 15 cm) where there is  severe small bowel wall thickening, mucosal hyperenhancement and   marked surrounding inflammatory changes in the adjacent small bowel mesentery. Findings compatible with developing small bowel obstruction related to a localized inflammatory process of the terminal ileum, possibly Crohn's disease versus acute infection.   Given the long segment this would unlikely be related to malignancy. No evidence for definitive perforation or abscess formation although there is significant inflammatory fluid in the pelvis. No definitive evidence for fistulization.    2.  No other areas of inflammatory change involving the small bowel. No evidence for inflammatory change involving the colon other than reactive changes at the cecum and ascending colon.    3.  Diffuse fatty infiltration of the liver.    4.  Minimally complex left upper pole renal cyst. Long-term follow-up ultrasound recommended.    5.  5 mm mean diameter pulmonary nodule right middle lobe. Recommend follow-up at 12 months per Fleischner Society criteria below.    Fleischner Society Recommendations for Pulmonary Nodules    Nodule size less than 6 mm:     Nodules < 6 mm do not require routine follow-up, but certain patients at high risk with suspicious nodule morphology, upper lobe location, or both may warrant 12-month follow-up.               Recent Labs   Lab 12/19/19  2041   WBC 13.3*   HGB 15.1   HCT 45.5   MCV 92        Recent Labs   Lab 12/19/19  2041      POTASSIUM 3.7   CHLORIDE 105   CO2 25   ANIONGAP 10   *   BUN 13   CR 0.56   GFRESTIMATED >90   GFRESTBLACK >90   SREEDHAR 9.6   PROTTOTAL 8.3   ALBUMIN 4.5   BILITOTAL 0.4   ALKPHOS 137   AST 20   ALT 32         Assessment and Plan:   Clive is a 50 yo female with past medical history of T3N0 rectal cancer s/p LAR in 2006 with subsequent chemotherapy and radiation.  She has had chronic diarrhea and incontinence since that surgery which she partially manages with Imodium.  She is admitted  with a 1-day history of severe abdominal pain and was found to have ileitis of 10-15cm of the distal terminal ileum.  She is currently NPO with IV fluids.  She has been started on Zosyn.  Gastroenterology recommends conservative treatment and did not think there was a role for steroids at this time.  We would recommend continued conservative treatment.  If she has further vomiting, an NG tube could be considered.  There is no urgent indication for surgery.      Plan:  1. Surgery: No emergent surgery indicated  2. Diet: NPO  3. IV Fluids: continue  4. Antibiotics:  continue  5. I&O s:  strict I&O s  6. Labs:   - Reviewed: by myself  - Ordered: none   7. Imaging:   - I have personally viewed: CT abd/pelvis  - Ordered:  none  8. Activity: ambulate as tolerated, encourage OOB  9. DVT prophylaxis: SCD s  10. This plan has been discussed with Dr. Varner    Patient specific identified risk factors considered as part of today s evaluation include: history of rectal cancer s/p LAR and chemoradiation.      Additional history obtained from chart review.  Time spent on consultation: 30 minutes, greater than 50 percent of the total encounter time is spent in counseling and/or coordination of care          Claudia Smiley PA-C  [facility]  Phone:  [phone]

## 2019-12-20 NOTE — PROGRESS NOTES
Pt arrived on the floor at around midnight accompanied by spouse.Welcomed to room and introduced to call light/wecome packet. Pt alert and oriented, lung sounds clear,up with SBA/ gait.NPO, iv infusing,Pain controled with iv dilaudid.hypobowel sounds,-flatus, on abx zosyn.Voiding adequate amounts in the bathroom.GI/ general surgery consult . Will continue to monitor.

## 2019-12-20 NOTE — PROGRESS NOTES
Clive had surgery and radiation for rectal cancer by Colorectal surgery at [facility].  Unfortunately, she did not go to [facility].    I would suggest that she be seen by Colorectal surgery here.   No consult done.  Macy Goetz MD

## 2019-12-20 NOTE — ED NOTES
[facility]  ED Nurse Handoff Report    Clive Mcnair is a 49 year old female   ED Chief complaint: Abdominal Pain  . ED Diagnosis:   Final diagnoses:   RUQ abdominal pain   Small bowel obstruction (H)   Right middle lobe pulmonary nodule     Allergies:   Allergies   Allergen Reactions     Sulfa Drugs Anaphylaxis     Gadoxetate Nausea and Vomiting and Hives       Code Status: Full Code  Activity level - Baseline/Home:  Independent. Activity Level - Current:   Stand by Assist. Lift room needed: No. Bariatric: No   Needed: No   Isolation: No. Infection: Not Applicable.     Vital Signs:   Vitals:    12/19/19 2001 12/19/19 2139 12/19/19 2230 12/19/19 2245   BP: (!) 140/128  (!) 136/98    Pulse: 131      Resp: 16      Temp: 97.2  F (36.2  C)      TempSrc: Temporal      SpO2: 99%   99%   Weight:  68 kg (150 lb)         Cardiac Rhythm:  ,      Pain level: 0-10 Pain Scale: 4  Patient confused: No. Patient Falls Risk: Yes.   Elimination Status: Has voided   Patient Report - Initial Complaint: right upper abdominal pain. Nausea and vomiting.  Focused Assessment: RUQ tenderness, hypoactive bowel sounds, generalized weakness, N/V  Tests Performed:   Labs Ordered and Resulted from Time of ED Arrival Up to the Time of Departure from the ED   CBC WITH PLATELETS DIFFERENTIAL - Abnormal; Notable for the following components:       Result Value    WBC 13.3 (*)     Absolute Neutrophil 11.5 (*)     All other components within normal limits   COMPREHENSIVE METABOLIC PANEL - Abnormal; Notable for the following components:    Glucose 132 (*)     All other components within normal limits   LIPASE   VITAL SIGNS   PULSE OXIMETRY NURSING   CARDIAC CONTINUOUS MONITORING   FREE WATER   ISTAT CG4 GASES LACTATE MARY NURSING POCT   ISTAT  GASES LACTATE MARY POCT     .edimage  . Abnormal Results: Read CT.   Treatments provided: IV Abx, morphine, pepcid, zofran  Family Comments:  at bedside   OBS brochure/video  discussed/provided to patient:  N/A  ED Medications:   Medications   lactated ringers BOLUS 1,000 mL (1,000 mLs Intravenous New Bag 12/19/19 2040)     Followed by   lactated ringers infusion (has no administration in time range)   ondansetron (ZOFRAN) injection 4 mg (4 mg Intravenous Given 12/19/19 2032)   morphine (PF) injection 4 mg (4 mg Intravenous Given 12/19/19 2032)   piperacillin-tazobactam (ZOSYN) infusion 3.375 g (3.375 g Intravenous New Bag 12/19/19 2300)   famotidine (PEPCID) injection 20 mg (20 mg Intravenous Given 12/19/19 2032)   CT Scan Flush (59 mLs Intravenous Given 12/19/19 2148)   iopamidol (ISOVUE-370) solution 500 mL (74 mLs Intravenous Given 12/19/19 2147)     Drips infusing:  Yes  For the majority of the shift, the patient's behavior Green. Interventions performed were N/A.     Severe Sepsis OR Septic Shock Diagnosis Present: No      ED Nurse Name/Phone Number: Adarsh Wellington RN,   11:25 PM  RECEIVING UNIT ED HANDOFF REVIEW    Above ED Nurse Handoff Report was reviewed: Yes  Reviewed by: Nagi Quiroz RN on December 19, 2019 at 11:55 PM

## 2019-12-20 NOTE — PLAN OF CARE
Pt has been NPO. Pt is getting IV dilaudid for pain control.  Pt has denied having nausea.  Pt has not had a BM this shift.

## 2019-12-20 NOTE — ED TRIAGE NOTES
Pt in with C/O upper abdominal pain which intermittently radiates to the RUQ. Pt reports nausea and vomiting throughout the day. Pt in from [facility]  for evaluation

## 2019-12-20 NOTE — PHARMACY-ADMISSION MEDICATION HISTORY
Admission medication history interview status for this patient is complete. See [facility] admission navigator for allergy information, prior to admission medications and immunization status.     Medication history interview source(s):Patient  Medication history resources (including written lists, pill bottles, clinic record):None    Changes made to PTA medication list:  Added: all  Deleted: none  Changed: none    Actions taken by pharmacist (provider contacted, etc):None     Additional medication history information:None    Medication reconciliation/reorder completed by provider prior to medication history? No    For patients on insulin therapy: no (Yes/No)   Lantus/levemir/NPH/Mix 70/30 dose: ___ in AM/PM or twice daily   Sliding scale Novolog Y/N   If Yes, do you have a baseline novolog pre-meal dose: ______units with meals   Patients eat three meals a day: Y/N ---  How many episodes of hypoglycemia (low blood glucose) do you have weekly: ---   How many missed doses do you have a week: ---  How many times do you check your blood glucose per day: ---  Any Barriers to therapy: cost of medications/comfortable with giving injections (if applicable)/ comfortable and confident with current diabetes regimen ---      Prior to Admission medications    Medication Sig Last Dose Taking? Auth Provider   cetirizine (ZYRTEC) 10 MG tablet Take 10 mg by mouth daily as needed for allergies Past Week at Unknown time Yes Unknown, Entered By History   DM-APAP-CPM (VICKS NYQUIL COLD & FLU NIGHT) -4 MG/30ML LIQD Take 15 mLs by mouth At Bedtime 12/18/2019 at Unknown time Yes Unknown, Entered By History   loperamide (IMODIUM) 2 MG capsule Take 2 mg by mouth 4 times daily as needed for diarrhea 12/19/2019 at Unknown time Yes Unknown, Entered By History   mometasone (NASONEX) 50 MCG/ACT nasal spray Spray 1 spray into both nostrils daily 12/18/2019 at Unknown time Yes Unknown, Entered By History

## 2019-12-21 VITALS
HEART RATE: 71 BPM | SYSTOLIC BLOOD PRESSURE: 137 MMHG | TEMPERATURE: 98.8 F | HEIGHT: 63 IN | BODY MASS INDEX: 29.23 KG/M2 | WEIGHT: 165 LBS | RESPIRATION RATE: 16 BRPM | DIASTOLIC BLOOD PRESSURE: 82 MMHG | OXYGEN SATURATION: 96 %

## 2019-12-21 LAB
ANION GAP SERPL CALCULATED.3IONS-SCNC: 5 MMOL/L (ref 3–14)
BASOPHILS # BLD AUTO: 0.1 10E9/L (ref 0–0.2)
BASOPHILS NFR BLD AUTO: 0.7 %
BUN SERPL-MCNC: 18 MG/DL (ref 7–30)
CALCIUM SERPL-MCNC: 8.5 MG/DL (ref 8.5–10.1)
CHLORIDE SERPL-SCNC: 111 MMOL/L (ref 94–109)
CO2 SERPL-SCNC: 27 MMOL/L (ref 20–32)
CREAT SERPL-MCNC: 0.7 MG/DL (ref 0.52–1.04)
DIFFERENTIAL METHOD BLD: NORMAL
EOSINOPHIL # BLD AUTO: 0.1 10E9/L (ref 0–0.7)
EOSINOPHIL NFR BLD AUTO: 1.2 %
ERYTHROCYTE [DISTWIDTH] IN BLOOD BY AUTOMATED COUNT: 13.3 % (ref 10–15)
GFR SERPL CREATININE-BSD FRML MDRD: >90 ML/MIN/{1.73_M2}
GLUCOSE SERPL-MCNC: 107 MG/DL (ref 70–99)
HCT VFR BLD AUTO: 38.2 % (ref 35–47)
HGB BLD-MCNC: 12.2 G/DL (ref 11.7–15.7)
IMM GRANULOCYTES # BLD: 0 10E9/L (ref 0–0.4)
IMM GRANULOCYTES NFR BLD: 0.5 %
LYMPHOCYTES # BLD AUTO: 2.2 10E9/L (ref 0.8–5.3)
LYMPHOCYTES NFR BLD AUTO: 25.9 %
MCH RBC QN AUTO: 30.5 PG (ref 26.5–33)
MCHC RBC AUTO-ENTMCNC: 31.9 G/DL (ref 31.5–36.5)
MCV RBC AUTO: 96 FL (ref 78–100)
MONOCYTES # BLD AUTO: 0.6 10E9/L (ref 0–1.3)
MONOCYTES NFR BLD AUTO: 6.7 %
NEUTROPHILS # BLD AUTO: 5.6 10E9/L (ref 1.6–8.3)
NEUTROPHILS NFR BLD AUTO: 65 %
NRBC # BLD AUTO: 0 10*3/UL
NRBC BLD AUTO-RTO: 0 /100
PLATELET # BLD AUTO: 229 10E9/L (ref 150–450)
POTASSIUM SERPL-SCNC: 3.5 MMOL/L (ref 3.4–5.3)
RBC # BLD AUTO: 4 10E12/L (ref 3.8–5.2)
SODIUM SERPL-SCNC: 143 MMOL/L (ref 133–144)
WBC # BLD AUTO: 8.6 10E9/L (ref 4–11)

## 2019-12-21 PROCEDURE — 36415 COLL VENOUS BLD VENIPUNCTURE: CPT | Performed by: INTERNAL MEDICINE

## 2019-12-21 PROCEDURE — 80048 BASIC METABOLIC PNL TOTAL CA: CPT | Performed by: INTERNAL MEDICINE

## 2019-12-21 PROCEDURE — 25000128 H RX IP 250 OP 636: Performed by: INTERNAL MEDICINE

## 2019-12-21 PROCEDURE — 85025 COMPLETE CBC W/AUTO DIFF WBC: CPT | Performed by: INTERNAL MEDICINE

## 2019-12-21 PROCEDURE — 99238 HOSP IP/OBS DSCHRG MGMT 30/<: CPT | Performed by: INTERNAL MEDICINE

## 2019-12-21 PROCEDURE — 25000132 ZZH RX MED GY IP 250 OP 250 PS 637: Performed by: INTERNAL MEDICINE

## 2019-12-21 RX ORDER — ACETAMINOPHEN 325 MG/1
650 TABLET ORAL EVERY 4 HOURS PRN
Status: DISCONTINUED | OUTPATIENT
Start: 2019-12-21 | End: 2019-12-21 | Stop reason: HOSPADM

## 2019-12-21 RX ADMIN — TAZOBACTAM SODIUM AND PIPERACILLIN SODIUM 3.38 G: 375; 3 INJECTION, SOLUTION INTRAVENOUS at 04:54

## 2019-12-21 RX ADMIN — ACETAMINOPHEN 650 MG: 325 TABLET, FILM COATED ORAL at 15:06

## 2019-12-21 RX ADMIN — TAZOBACTAM SODIUM AND PIPERACILLIN SODIUM 3.38 G: 375; 3 INJECTION, SOLUTION INTRAVENOUS at 09:28

## 2019-12-21 ASSESSMENT — ACTIVITIES OF DAILY LIVING (ADL)
ADLS_ACUITY_SCORE: 12

## 2019-12-21 NOTE — PROVIDER NOTIFICATION
Admission pager notified: Pt urine is cloudy looking. She has no other symptoms but wondering if you want me to send UA?

## 2019-12-21 NOTE — PLAN OF CARE
Pt denies pain, no N/V. Abd soft/nontender. Active BS. Passing flatus. Several stools this morning- several small, soft, formed. Able to tolerate reg diet. Significant other at bedside to transport pt home. All belongings collected. AVS signed. All discharge education completed. Adequate for discharge.

## 2019-12-21 NOTE — PROGRESS NOTES
"GI FOLLOW UP    S: +flatus last night, +BMs today.  Feels great.     O:BP (!) 137/91 (BP Location: Right arm)   Pulse 83   Temp 98.5  F (36.9  C) (Oral)   Resp 16   Ht 1.6 m (5' 3\")   Wt 74.8 kg (165 lb)   SpO2 95%   BMI 29.23 kg/m    GEN: NAD  HEENT: no scleral icterus  CV: RRR  PULM: normal work of breathing  ABD: soft, NTND    Labs: reviewed    A/P:  50 yo F with hx of rectal cancer, admitted with a SBO with ileal thickening.  ? Crohn's disease vs radiation enteritis vs adhesions.  Dramatically improved overnight.  -ok from GI standpoint to have CLD and ADAT.   -pt follows at [facility] and will follow up there to have a colonoscopy with terminal ileum intubation to evaluate for Crohn's disease.  -GI will sign off. Please call with any questions.    Tiera Estrella MD  [facility]  "

## 2019-12-21 NOTE — PROGRESS NOTES
"Colon & Rectal Surgery Progress Note             Interval History:   HD #3  Feeling much better, 10 loose stools, + flatus, no  nausea or pain.               Medications:   I have reviewed this patient's current medications               Physical Exam:   Blood pressure (!) 137/91, pulse 83, temperature 98.5  F (36.9  C), temperature source Oral, resp. rate 16, height 1.6 m (5' 3\"), weight 74.8 kg (165 lb), SpO2 95 %.    Intake/Output Summary (Last 24 hours) at 12/21/2019 1029  Last data filed at 12/21/2019 0400  Gross per 24 hour   Intake 1209 ml   Output 200 ml   Net 1009 ml     GEN:  alert  ABD:  Soft, non-tender         Data:        Lab Results   Component Value Date     12/21/2019    Lab Results   Component Value Date    CHLORIDE 111 12/21/2019    Lab Results   Component Value Date    BUN 18 12/21/2019      Lab Results   Component Value Date    POTASSIUM 3.5 12/21/2019    Lab Results   Component Value Date    CO2 27 12/21/2019    Lab Results   Component Value Date    CR 0.70 12/21/2019    CR 0.56 12/19/2019        Lab Results   Component Value Date    HGB 12.2 12/21/2019    HGB 15.1 12/19/2019     Lab Results   Component Value Date     12/21/2019     12/19/2019     Lab Results   Component Value Date    WBC 8.6 12/21/2019    WBC 13.3 (H) 12/19/2019            Assessment and Plan:   Resolving bowel obstruction.  Clears and advance to fulls.   Not sure the role of ABX but will leave that up to GI.  No plans for surgery.   Follow up with GI.     Sindy Varner MD  [facility]  Office Phone # [phone]    "

## 2019-12-21 NOTE — PLAN OF CARE
A&O x4. LS CTA all. BS hypoactive, abdominal pain improved, multiple BMs this shift. NPO. up independently. Denies pain. voiding in good amts. Hopes to advance diet and discharge today if able.

## 2019-12-21 NOTE — DISCHARGE SUMMARY
[facility]  Hospitalist Discharge Summary       Date of Admission:  12/19/2019  Date of Discharge:  12/21/2019  Discharging Provider: Ruiz Reynaga MD      Discharge Diagnoses   Ileitis    Follow-ups Needed After Discharge   Follow-up Appointments     Follow-up and recommended labs and tests       Follow-up with [facility] gastroenterology for colonoscopy with   terminal ileum intubation to evaluate for Crohn's disease.             Unresulted Labs Ordered in the Past 30 Days of this Admission     Date and Time Order Name Status Description    12/20/2019 2200 Urine Culture Aerobic Bacterial Preliminary       These results will be followed up by [facility]    Discharge Disposition   Discharged to home  Condition at discharge: Stable    Hospital Course   Admission H&P:  Clive Mcnair is a 49 year old female who has a past medical history significant for IBS and rectal cancer status post LAR, chemo and radiation.  She developed fairly acute onset of abdominal pain early in the day on 12/19.  She does have fairly frequent episodes of abdominal pain due to her IBS.  Abdominal pain that started on 12/19 felt significantly different than usual abdominal pain from her IBS.  Pain is located in the upper quadrants of her abdomen bilaterally.  Usually she has pain in her lower abdomen.  She did try taking some ibuprofen for pain at home.  Ibuprofen did not help any significant extent with pain.  She also developed nausea and has vomited 5-6 times since symptoms began.  She is also felt like she has had intermittent fevers and chills.Symptoms are somewhat similar to symptoms that she had when she required removal of her gallbladder a year and a half ago.  Symptoms are better at this time after pain and nausea medications given since arrival at hospital.  No other acute complaints    Course:  Abdominal pain and possible SBO secondary to terminal ileitis  Patient presented with above complaints.  Labs on  admission notable for mild leukocytosis to 13.3.  CT of the abdomen pelvis with contrast showed dilation of multiple small bowel loops in right pelvis extending to the distal terminal ileum with severe adjacent small bowel wall thickening and surrounding inflammatory change.  Findings were felt to be compatible with developing small bowel obstruction related to localized inflammatory process. The etiology was unclear, but felt to be potentially related to possibly radiation versus Crohn's disease.  The patient had a history of having radiation for colorectal malignancy.    Patient was admitted to the floor and started on IV Zosyn and pain medication. Gastroenterology and colorectal surgery were consulted.  No urgent endoscopic or surgical intervention was indicated.  Patient was observed overnight and pain markedly improved with bowel rest.  Antibiotics were discontinued.  Her diet was advanced to regular, which she tolerated well.  Plan going forward will be to have her follow-up with her previous treating GI physician for colonoscopy to have the terminal ileum intubated to evaluate for possible Crohn's disease.  She will not be discharged on steroids or antibiotics.      Consultations This Hospital Stay   GASTROENTEROLOGY IP CONSULT  SURGERY GENERAL IP CONSULT  COLORECTAL SURGERY IP CONSULT    Code Status   Full Code    Time Spent on this Encounter   I, Ruiz Reynaga MD, personally saw the patient today and spent less than or equal to 30 minutes discharging this patient.       Ruiz Reynaga MD  [facility]  ______________________________________________________________________    Physical Exam   Vital Signs: Temp: 98.5  F (36.9  C) Temp src: Oral BP: (!) 137/91 Pulse: 83   Resp: 16 SpO2: 95 % O2 Device: None (Room air)    Weight: 165 lbs 0 oz    General: Appears well, no distress.     HEENT: No scleral icterus.    Neck: Supple.    Pulmonary: Normal work of breathing. Clear to  auscultation bilaterally.    Cardiovascular: Regular rate and rhythm without murmur or extra heart sounds.    Abdomen: Soft and non-tender.    Extremities: No peripheral edema.    Neurologic: Awake, alert, appropriate.    Skin: Warm and dry.    Psychiatric: Normal affect           Primary Care Physician   Marcela Gonzales    Discharge Orders      Reason for your hospital stay    You were admitted for abdominal pain. The CT scan showed an area of inflammation in an area of the bowel called the ileum.  We are unsure what caused this, but it is reassuring that the pain improved so quickly.     Follow-up and recommended labs and tests     Follow-up with [facility] gastroenterology for colonoscopy with terminal ileum intubation to evaluate for Crohn's disease.     Activity    Your activity upon discharge: activity as tolerated     Diet    Follow this diet upon discharge: Regular       Significant Results and Procedures   Most Recent 3 CBC's:  Recent Labs   Lab Test 12/21/19 0833 12/19/19 2041   WBC 8.6 13.3*   HGB 12.2 15.1   MCV 96 92    298     Most Recent 3 BMP's:  Recent Labs   Lab Test 12/21/19 0833 12/19/19 2041    140   POTASSIUM 3.5 3.7   CHLORIDE 111* 105   CO2 27 25   BUN 18 13   CR 0.70 0.56   ANIONGAP 5 10   SREEDHAR 8.5 9.6   * 132*     Most Recent 2 LFT's:  Recent Labs   Lab Test 12/19/19 2041   AST 20   ALT 32   ALKPHOS 137   BILITOTAL 0.4   ,   Results for orders placed or performed during the hospital encounter of 12/19/19   CT Abdomen Pelvis w Contrast    Narrative    EXAM: CT ABDOMEN PELVIS W CONTRAST  LOCATION: [facility]  DATE/TIME: 12/19/2019 9:47 PM    INDICATION: Epigastric abdominal pain with tachycardia.  COMPARISON: None.  TECHNIQUE: CT scan of the abdomen and pelvis was performed following injection of IV contrast. Multiplanar reformats were obtained. Dose reduction techniques were used.  CONTRAST: 74 mL Isovue-370.    FINDINGS:   LOWER CHEST: 5 mm mean  diameter pulmonary nodule involving the anterior aspect of the right middle lobe, partially visualized (series 3, image 1). No focal infiltrate, consolidation or pleural fluid.    HEPATOBILIARY: Diffuse fatty infiltration of the liver.    Low-attenuation subcentimeter liver lesion(s) compatible with benign cysts or other benign lesions. No specific evaluation or follow-up is recommended in a low risk patient.  Cholecystectomy. Mild extrahepatic biliary prominence likely related to   postcholecystectomy reservoir state.    PANCREAS: No significant mass, duct dilatation, or inflammatory change.    SPLEEN: Normal size spleen. No focal splenic lesions. Splenic vein patent.    ADRENAL GLANDS: No significant nodules.    KIDNEYS/BLADDER: Minimally complex cystic lesion involving the posterior aspect of the left upper renal pole containing peripheral calcification or milk of calcium and measuring 1.2 cm. Adjacent cortical scarring. Low-attenuation subcentimeter renal   lesion(s). These are compatible with small benign cysts and no specific imaging evaluation or follow-up is recommended. Cortical scarring anterior aspect right kidney. No urinary collecting system dilatation. No overt bladder abnormality.    BOWEL: Dilatation of multiple loops of small bowel throughout the right mid abdomen extending into the right pelvis up to level of the distal 10-15 cm of terminal ileum where there is severe wall thickening, mucosal hyperenhancement and marked   surrounding inflammatory change in the adjacent small bowel mesentery (series 3, image 57). Findings compatible with small bowel obstruction related to a localized inflammatory process of the terminal ileum, possibly Crohn's disease versus an acute   infectious etiology. Underlying chronic stricture cannot be excluded in this area. No definitive evidence for fistulization, perforation or abscess formation. Associated with this inflammatory process is minimal mesenteric edema. No  wall thickening   involving the dilated portions of the bowel or elsewhere. No other areas of acute inflammatory change involving the small bowel. Likely mild reactive inflammatory change involving the collapsed cecum and ascending colon. Postoperative changes at the   rectosigmoid junction. Distal esophagus, stomach and duodenum unremarkable.    LYMPH NODES: No lymphadenopathy.    VASCULATURE: Normal caliber abdominal aorta. Minimal atherosclerotic vascular calcification.    PELVIC ORGANS: No overt uterine or adnexal abnormality.    OTHER: Minimal free fluid in the pelvis with minimal to moderate mesenteric edema right lower quadrant amongst the dilated and inflamed small bowel loops.    MUSCULOSKELETAL: No overt osseous abnormality.      Impression    IMPRESSION:   1.  Dilatation of multiple small bowel loops throughout the right mid abdomen and right pelvis extending up to the distal terminal ileum (approximately the last 10 to 15 cm) where there is severe small bowel wall thickening, mucosal hyperenhancement and   marked surrounding inflammatory changes in the adjacent small bowel mesentery. Findings compatible with developing small bowel obstruction related to a localized inflammatory process of the terminal ileum, possibly Crohn's disease versus acute infection.   Given the long segment this would unlikely be related to malignancy. No evidence for definitive perforation or abscess formation although there is significant inflammatory fluid in the pelvis. No definitive evidence for fistulization.    2.  No other areas of inflammatory change involving the small bowel. No evidence for inflammatory change involving the colon other than reactive changes at the cecum and ascending colon.    3.  Diffuse fatty infiltration of the liver.    4.  Minimally complex left upper pole renal cyst. Long-term follow-up ultrasound recommended.    5.  5 mm mean diameter pulmonary nodule right middle lobe. Recommend follow-up at 12  months per Fleischner Society criteria below.    Fleischner Society Recommendations for Pulmonary Nodules    Nodule size less than 6 mm:     Nodules < 6 mm do not require routine follow-up, but certain patients at high risk with suspicious nodule morphology, upper lobe location, or both may warrant 12-month follow-up.               Discharge Medications   Current Discharge Medication List      CONTINUE these medications which have NOT CHANGED    Details   cetirizine (ZYRTEC) 10 MG tablet Take 10 mg by mouth daily as needed for allergies      DM-APAP-CPM (VICKS NYQUIL COLD & FLU NIGHT) -4 MG/30ML LIQD Take 15 mLs by mouth At Bedtime      loperamide (IMODIUM) 2 MG capsule Take 2 mg by mouth 4 times daily as needed for diarrhea      mometasone (NASONEX) 50 MCG/ACT nasal spray Spray 1 spray into both nostrils daily           Allergies   Allergies   Allergen Reactions     Sulfa Drugs Anaphylaxis     Gadoxetate Nausea and Vomiting and Hives

## 2019-12-22 LAB
BACTERIA SPEC CULT: NO GROWTH
Lab: NORMAL
SPECIMEN SOURCE: NORMAL

## 2020-03-22 ENCOUNTER — HEALTH MAINTENANCE LETTER (OUTPATIENT)
Age: 50
End: 2020-03-22

## 2021-01-15 ENCOUNTER — HEALTH MAINTENANCE LETTER (OUTPATIENT)
Age: 51
End: 2021-01-15

## 2021-05-16 ENCOUNTER — HEALTH MAINTENANCE LETTER (OUTPATIENT)
Age: 51
End: 2021-05-16

## 2021-09-05 ENCOUNTER — HEALTH MAINTENANCE LETTER (OUTPATIENT)
Age: 51
End: 2021-09-05

## 2022-06-11 ENCOUNTER — HEALTH MAINTENANCE LETTER (OUTPATIENT)
Age: 52
End: 2022-06-11

## 2022-10-22 ENCOUNTER — HEALTH MAINTENANCE LETTER (OUTPATIENT)
Age: 52
End: 2022-10-22

## 2023-06-18 ENCOUNTER — HEALTH MAINTENANCE LETTER (OUTPATIENT)
Age: 53
End: 2023-06-18